# Patient Record
Sex: FEMALE | Race: WHITE | NOT HISPANIC OR LATINO | Employment: FULL TIME | URBAN - METROPOLITAN AREA
[De-identification: names, ages, dates, MRNs, and addresses within clinical notes are randomized per-mention and may not be internally consistent; named-entity substitution may affect disease eponyms.]

---

## 2019-07-22 ENCOUNTER — HOSPITAL ENCOUNTER (OUTPATIENT)
Dept: ULTRASOUND IMAGING | Facility: MEDICAL CENTER | Age: 49
Discharge: HOME | End: 2019-07-22
Payer: COMMERCIAL

## 2019-07-22 DIAGNOSIS — C73 PAPILLARY THYROID CARCINOMA (HCC): ICD-10-CM

## 2019-07-22 PROCEDURE — 76536 US EXAM OF HEAD AND NECK: CPT

## 2021-03-31 DIAGNOSIS — Z23 ENCOUNTER FOR IMMUNIZATION: ICD-10-CM

## 2022-08-02 ENCOUNTER — HOSPITAL ENCOUNTER (OUTPATIENT)
Dept: ULTRASOUND IMAGING | Facility: MEDICAL CENTER | Age: 52
Discharge: HOME | End: 2022-08-02
Payer: COMMERCIAL

## 2022-08-02 DIAGNOSIS — C73 PAPILLARY THYROID CARCINOMA (HCC): ICD-10-CM

## 2022-08-02 PROCEDURE — 76536 US EXAM OF HEAD AND NECK: CPT

## 2022-12-27 ENCOUNTER — OFFICE VISIT (OUTPATIENT)
Dept: FAMILY MEDICINE CLINIC | Facility: CLINIC | Age: 52
End: 2022-12-27

## 2022-12-27 VITALS
HEIGHT: 67 IN | TEMPERATURE: 98.1 F | SYSTOLIC BLOOD PRESSURE: 110 MMHG | OXYGEN SATURATION: 98 % | WEIGHT: 185 LBS | DIASTOLIC BLOOD PRESSURE: 80 MMHG | HEART RATE: 70 BPM | BODY MASS INDEX: 29.03 KG/M2

## 2022-12-27 DIAGNOSIS — J02.9 SORE THROAT: ICD-10-CM

## 2022-12-27 DIAGNOSIS — J01.00 ACUTE NON-RECURRENT MAXILLARY SINUSITIS: Primary | ICD-10-CM

## 2022-12-27 DIAGNOSIS — R05.1 ACUTE COUGH: ICD-10-CM

## 2022-12-27 DIAGNOSIS — R09.81 CONGESTION OF NASAL SINUS: ICD-10-CM

## 2022-12-27 RX ORDER — PSEUDOEPHEDRINE HCL 30 MG
100 TABLET ORAL
COMMUNITY
Start: 2022-12-19 | End: 2023-01-18

## 2022-12-27 RX ORDER — PHENAZOPYRIDINE HYDROCHLORIDE 100 MG/1
100 TABLET, FILM COATED ORAL
COMMUNITY
End: 2022-12-27 | Stop reason: ALTCHOICE

## 2022-12-27 RX ORDER — DOXYCYCLINE HYCLATE 50 MG/1
324 CAPSULE, GELATIN COATED ORAL
COMMUNITY
Start: 2022-12-19 | End: 2023-01-18

## 2022-12-27 RX ORDER — ALPRAZOLAM 0.25 MG/1
0.25 TABLET ORAL
COMMUNITY
Start: 2022-11-17

## 2022-12-27 RX ORDER — NITROFURANTOIN 25; 75 MG/1; MG/1
CAPSULE ORAL
COMMUNITY
Start: 2022-11-17 | End: 2022-12-27 | Stop reason: ALTCHOICE

## 2022-12-27 RX ORDER — LEVOTHYROXINE SODIUM 137 UG/1
137 TABLET ORAL DAILY
COMMUNITY
Start: 2014-03-05

## 2022-12-27 RX ORDER — AMOXICILLIN AND CLAVULANATE POTASSIUM 875; 125 MG/1; MG/1
1 TABLET, FILM COATED ORAL EVERY 12 HOURS SCHEDULED
Qty: 20 TABLET | Refills: 0
Start: 2022-12-27 | End: 2023-01-06

## 2022-12-27 NOTE — PROGRESS NOTES
Assessment/Plan:    No problem-specific Assessment & Plan notes found for this encounter  Diagnoses and all orders for this visit:    Acute non-recurrent maxillary sinusitis  -     amoxicillin-clavulanate (Augmentin) 875-125 mg per tablet; Take 1 tablet by mouth every 12 (twelve) hours for 10 days    Congestion of nasal sinus    Acute cough    Sore throat    Other orders  -     ALPRAZolam (XANAX) 0 25 mg tablet; Take 0 25 mg by mouth (Patient not taking: Reported on 12/27/2022)  -     Docusate Sodium (DSS) 100 MG CAPS; Take 100 mg by mouth  -     ferrous gluconate (FERGON) 324 mg tablet; Take 324 mg by mouth  -     levothyroxine 137 mcg tablet; Take 137 mcg by mouth daily  -     Discontinue: nitrofurantoin (MACROBID) 100 mg capsule; Take 1 capsule after intercourse (Patient not taking: Reported on 12/27/2022)  -     norethindrone (AYGESTIN) 5 mg tablet; Take 5 mg by mouth 2 (two) times a day  -     norethindrone-ethinyl estradiol 0 5/0 75/1-35 MG-MCG per tablet; Take 1 tablet by mouth every morning (Patient not taking: Reported on 12/27/2022)  -     Discontinue: phenazopyridine (PYRIDIUM) 100 mg tablet; Take 100 mg by mouth (Patient not taking: Reported on 12/27/2022)          Patient is a 58-year-old female presenting with symptoms for the past 10 days to 2 weeks  She has a history of sinus infections and her presentation today is most consistent with maxillary sinusitis  I will start patient on a course of Augmentin twice daily for 10 days  Medication was given from our office supply  I recommended supportive care at home with over-the-counter allergy medicine, Flonase, Mucinex as needed  She should follow-up with PCP or in our office if not improved after treatment course  Recommend to stay hydrated while feeling ill  20 minutes spent on examination and plan of care  This note was dictated using software  Subjective:      Patient ID: Vipul Poole is a 47 y o  female      HPI    The following portions of the patient's history were reviewed and updated as appropriate: allergies, current medications, past family history, past medical history, past social history, past surgical history and problem list      Patient states that she has congestion, cough, pressure in the face, sore throat  She states that this has been going on for the past two weeks  She states that last night her throat has felt on fire  She states that she has been having a lot of drainage in the throat  She also has pressure in the ears  Review of Systems   Constitutional: Negative for fever  HENT: Positive for congestion, ear pain, postnasal drip, sinus pressure, sinus pain and sore throat  Respiratory: Positive for cough  Negative for shortness of breath  Cardiovascular: Negative for chest pain  Gastrointestinal: Negative for abdominal pain, constipation, diarrhea, nausea and vomiting  Skin: Negative for rash  Neurological: Negative for headaches  Objective:      /80 (BP Location: Left arm, Patient Position: Sitting, Cuff Size: Standard)   Pulse 70   Temp 98 1 °F (36 7 °C)   Ht 5' 7" (1 702 m)   Wt 83 9 kg (185 lb)   SpO2 98%   BMI 28 98 kg/m²          Physical Exam  Vitals and nursing note reviewed  Constitutional:       General: She is not in acute distress  Appearance: Normal appearance  HENT:      Head: Normocephalic and atraumatic  Right Ear: Tympanic membrane, ear canal and external ear normal  There is no impacted cerumen  Left Ear: Tympanic membrane, ear canal and external ear normal  There is no impacted cerumen  Ears:      Comments: Fluid noted behind both tms     Nose: Congestion present  Mouth/Throat:      Mouth: Mucous membranes are moist       Pharynx: No oropharyngeal exudate or posterior oropharyngeal erythema  Comments: Postnasal drip  Eyes:      General:         Right eye: No discharge  Left eye: No discharge        Extraocular Movements: Extraocular movements intact  Conjunctiva/sclera: Conjunctivae normal       Pupils: Pupils are equal, round, and reactive to light  Cardiovascular:      Rate and Rhythm: Normal rate and regular rhythm  Heart sounds: Normal heart sounds  No murmur heard  No friction rub  No gallop  Pulmonary:      Effort: Pulmonary effort is normal  No respiratory distress  Breath sounds: Normal breath sounds  No wheezing  Musculoskeletal:         General: Normal range of motion  Cervical back: Normal range of motion  Skin:     General: Skin is warm and dry  Findings: No erythema or rash  Neurological:      General: No focal deficit present  Mental Status: She is alert and oriented to person, place, and time  Mental status is at baseline  Psychiatric:         Mood and Affect: Mood normal          Behavior: Behavior normal          Thought Content:  Thought content normal          Judgment: Judgment normal

## 2023-06-21 LAB
EXTERNAL HIV CONFIRMATION: NORMAL
EXTERNAL HIV SCREEN: NORMAL
EXTERNAL HIV SCREEN: NORMAL
HCV AB SER-ACNC: NEGATIVE

## 2023-09-18 ENCOUNTER — TELEPHONE (OUTPATIENT)
Dept: ADMINISTRATIVE | Facility: OTHER | Age: 53
End: 2023-09-18

## 2023-09-18 NOTE — TELEPHONE ENCOUNTER
----- Message from Vik Snow sent at 9/18/2023  7:54 AM EDT -----  Regarding: care gap request  01/04/23 1:48 PM    Hello, our patient attached above Pap Smear (HPV) aka Cervical Cancer Screening completed/performed. Please assist in updating the patient chart by pulling the Care Everywhere (CE) document. The date of service is 8/10/22.      Thank you,  Vik Snow  [address] [place] [address]

## 2023-09-18 NOTE — TELEPHONE ENCOUNTER
Upon review of the In Basket request we were able to locate, review, and update the patient chart as requested for Mammogram and Pap Smear (HPV) aka Cervical Cancer Screening. mammogram, HIV, HEP C    Any additional questions or concerns should be emailed to the Practice Liaisons via the appropriate education email address, please do not reply via In Basket.     Thank you  Sinan Robertson

## 2023-09-18 NOTE — TELEPHONE ENCOUNTER
----- Message from Mary Baker sent at 9/18/2023  7:55 AM EDT -----  Regarding: care gap request  01/04/23 1:48 PM    Hello, our patient attached above Hepatitis C and HIV completed/performed. Please assist in updating the patient chart by pulling the Care Everywhere (CE) document. The date of service is 6/21/23.      Thank you,  Mary Baker  [address] [place] [address]

## 2023-09-18 NOTE — TELEPHONE ENCOUNTER
----- Message from Candie Lee sent at 9/18/2023  7:54 AM EDT -----  Regarding: care gap request  01/04/23 1:48 PM    Hello, our patient attached above Mammogram completed/performed. Please assist in updating the patient chart by pulling the Care Everywhere (CE) document. The date of service is 6/28/23.      Thank you,  Candie Lee  [address] [facility]

## 2023-09-18 NOTE — TELEPHONE ENCOUNTER
----- Message from Mariela Armendariz sent at 9/18/2023  7:55 AM EDT -----  Regarding: care gap request  01/04/23 1:48 PM    Hello, our patient attached above Hepatitis C and HIV completed/performed. Please assist in updating the patient chart by pulling the Care Everywhere (CE) document. The date of service is 6/21/23.      Thank you,  Mariela Armendariz  [address] [facility]

## 2023-09-19 PROBLEM — F41.1 GENERALIZED ANXIETY DISORDER: Status: ACTIVE | Noted: 2018-08-30

## 2023-09-19 PROBLEM — E78.5 HYPERLIPIDEMIA: Status: ACTIVE | Noted: 2017-07-06

## 2023-09-19 PROBLEM — B00.9 HERPES: Status: ACTIVE | Noted: 2023-09-19

## 2023-09-19 PROBLEM — M54.2 NECK PAIN: Status: ACTIVE | Noted: 2023-09-19

## 2023-09-19 PROBLEM — M17.9 OSTEOARTHRITIS OF KNEE: Status: ACTIVE | Noted: 2023-09-19

## 2023-09-19 PROBLEM — R07.9 CHEST PAIN SYNDROME: Status: ACTIVE | Noted: 2017-07-06

## 2023-09-19 PROBLEM — I49.3 PVC'S (PREMATURE VENTRICULAR CONTRACTIONS): Status: ACTIVE | Noted: 2023-09-19

## 2023-09-19 RX ORDER — DOXYCYCLINE 100 MG/1
CAPSULE ORAL
COMMUNITY

## 2023-09-19 RX ORDER — METRONIDAZOLE 500 MG/1
TABLET ORAL
COMMUNITY
Start: 2023-09-10

## 2023-09-19 RX ORDER — PHENAZOPYRIDINE HYDROCHLORIDE 200 MG/1
TABLET, FILM COATED ORAL
COMMUNITY
Start: 2023-09-08

## 2023-09-19 RX ORDER — PREDNISONE 20 MG/1
TABLET ORAL
COMMUNITY

## 2023-09-19 RX ORDER — CLOTRIMAZOLE AND BETAMETHASONE DIPROPIONATE 10; .64 MG/G; MG/G
CREAM TOPICAL
COMMUNITY

## 2023-09-19 RX ORDER — MELOXICAM 15 MG/1
TABLET ORAL
COMMUNITY

## 2023-09-19 RX ORDER — PANTOPRAZOLE SODIUM 40 MG/1
TABLET, DELAYED RELEASE ORAL
COMMUNITY

## 2023-09-19 RX ORDER — VALACYCLOVIR HYDROCHLORIDE 500 MG/1
TABLET, FILM COATED ORAL
COMMUNITY

## 2023-09-19 RX ORDER — NITROFURANTOIN 25; 75 MG/1; MG/1
CAPSULE ORAL
COMMUNITY

## 2023-09-19 RX ORDER — DICLOFENAC SODIUM 30 MG/G
GEL TOPICAL
COMMUNITY

## 2023-09-19 RX ORDER — FLUCONAZOLE 150 MG/1
1 TABLET ORAL ONCE
COMMUNITY

## 2023-09-19 RX ORDER — NITROFURANTOIN 25; 75 MG/1; MG/1
CAPSULE ORAL
COMMUNITY
Start: 2023-08-31

## 2023-09-19 RX ORDER — DICYCLOMINE HCL 20 MG
TABLET ORAL
COMMUNITY

## 2023-09-19 RX ORDER — SUCRALFATE ORAL 1 G/10ML
SUSPENSION ORAL
COMMUNITY

## 2023-09-19 RX ORDER — PHENAZOPYRIDINE HYDROCHLORIDE 200 MG/1
TABLET, FILM COATED ORAL
COMMUNITY

## 2023-11-27 ENCOUNTER — HOSPITAL ENCOUNTER (OUTPATIENT)
Dept: ULTRASOUND IMAGING | Facility: MEDICAL CENTER | Age: 53
Discharge: HOME | End: 2023-11-27
Payer: COMMERCIAL

## 2023-11-27 DIAGNOSIS — Z08 ENCOUNTER FOR FOLLOW-UP SURVEILLANCE OF THYROID CANCER: ICD-10-CM

## 2023-11-27 DIAGNOSIS — Z85.850 ENCOUNTER FOR FOLLOW-UP SURVEILLANCE OF THYROID CANCER: ICD-10-CM

## 2023-11-27 PROCEDURE — 76536 US EXAM OF HEAD AND NECK: CPT

## 2024-11-17 ENCOUNTER — OFFICE VISIT (OUTPATIENT)
Age: 54
End: 2024-11-17
Payer: COMMERCIAL

## 2024-11-17 VITALS
HEIGHT: 66 IN | SYSTOLIC BLOOD PRESSURE: 110 MMHG | TEMPERATURE: 96.7 F | WEIGHT: 150 LBS | DIASTOLIC BLOOD PRESSURE: 72 MMHG | BODY MASS INDEX: 24.11 KG/M2 | HEART RATE: 71 BPM | OXYGEN SATURATION: 97 % | RESPIRATION RATE: 18 BRPM
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
## 2024-11-17 DIAGNOSIS — S10.96XA TICK BITE OF NECK, INITIAL ENCOUNTER: Primary | ICD-10-CM

## 2024-11-17 PROCEDURE — G0382 LEV 3 HOSP TYPE B ED VISIT: HCPCS | Performed by: PHYSICIAN ASSISTANT

## 2024-11-17 PROCEDURE — S9083 URGENT CARE CENTER GLOBAL: HCPCS | Performed by: PHYSICIAN ASSISTANT

## 2024-11-17 RX ORDER — DOXYCYCLINE 100 MG/1
200 CAPSULE ORAL ONCE
Qty: 2 CAPSULE | Refills: 0 | Status: SHIPPED | OUTPATIENT
Start: 2024-11-17 | End: 2024-11-17

## 2024-11-21 NOTE — PROGRESS NOTES
[facility]        NAME: She Starks is a 54 y.o. female  : 1970    MRN: [id number]  DATE: 2024  TIME: 2:22 PM    Assessment and Plan   Tick bite of neck, initial encounter [S10.96XA, W57.XXXA]  1. Tick bite of neck, initial encounter  doxycycline monohydrate (MONODOX) 100 mg capsule            Patient Instructions     Patient has a tick bite of the left posterior neck that she partially removed but the head is still embedded.  Was unable to remove despite attempt to do so with splinter forceps and and end of an 11 blade scalpel.  I did provide patient reassurance that it should grow out with the skin.  I prophylax her with a 200 mg one-time dose of doxycycline and discussed local wound care.  Area was dressed with topical antibiotic ointment and a Band-Aid.  Should be reevaluated if any complications arise.  She should also monitor for any onset of acute Lyme symptoms and follow-up with her PCP for Lyme testing if this occurs.  She did note at the end of the visit that she definitively wanted the head removed and that she may be going somewhere else to have this done.  Follow up with PCP in 3-5 days.  Proceed to  ER if symptoms worsen.    If tests have been performed at [facility], our office will contact you with results if changes need to be made to the care plan discussed with you at the visit.  [facility]    Chief Complaint     Chief Complaint   Patient presents with    Tick Removal     Tick back of neck from today.          History of Present Illness       Patient presents with what she reports is a tick bite of her left posterior neck.  She noticed it today and she tried to remove the tick but believes there is still a piece embedded in her skin.  She is requesting the remainder of the tick be removed.  She denies any other symptoms or complaints at this time.        Review of Systems   Review of Systems   Constitutional: Negative.     Eyes: Negative.    Respiratory: Negative.     Cardiovascular: Negative.    Gastrointestinal: Negative.    Genitourinary: Negative.    Musculoskeletal: Negative.    Skin:  Positive for wound (Tick bite left posterior neck). Negative for rash.   Neurological: Negative.          Current Medications       Current Outpatient Medications:     ALPRAZolam (XANAX) 0.25 mg tablet, Take 0.25 mg by mouth, Disp: , Rfl:     B Complex Vitamins (B COMPLEX 1 PO), Take 1 capsule by mouth every morning, Disp: , Rfl:     levothyroxine 137 mcg tablet, Take 137 mcg by mouth daily, Disp: , Rfl:     ascorbic acid (VITAMIN C) 500 MG tablet, Take 1 tablet by mouth every morning (Patient not taking: Reported on 11/17/2024), Disp: , Rfl:     Chelated Zinc 50 MG TABS, Take 50 mg by mouth (Patient not taking: Reported on 11/17/2024), Disp: , Rfl:     clotrimazole-betamethasone (LOTRISONE) 1-0.05 % cream, MASSAGE TWICE DAILY INTO THE AREA AROUND THE VAGINA UNTIL SYMPTOMS RESOLVE. (Patient not taking: Reported on 11/17/2024), Disp: , Rfl:     Diclofenac Sodium (SOLARAZE) 3 % GEL, , Disp: , Rfl:     dicyclomine (BENTYL) 20 mg tablet, , Disp: , Rfl:     Docusate Sodium (DSS) 100 MG CAPS, Take 100 mg by mouth, Disp: , Rfl:     doxycycline monohydrate (MONODOX) 100 mg capsule, TAKE 1 CAPSULE (100 MG TOTAL) BY MOUTH IN THE MORNING AND IN THE EVENING FOR 10 DAYS (Patient not taking: Reported on 11/17/2024), Disp: , Rfl:     ferrous gluconate (FERGON) 324 mg tablet, Take 324 mg by mouth, Disp: , Rfl:     fluconazole (DIFLUCAN) 150 mg tablet, Take 1 tablet by mouth once (Patient not taking: Reported on 11/17/2024), Disp: , Rfl:     meloxicam (MOBIC) 15 mg tablet, TAKE 1 TABLET BY MOUTH DAILY AFTER A MEAL (Patient not taking: Reported on 11/17/2024), Disp: , Rfl:     metroNIDAZOLE (FLAGYL) 500 mg tablet, TAKE 1 TABLET BY MOUTH TWICE A DAY FOR 7 DAYS (Patient not taking: Reported on 11/17/2024), Disp: , Rfl:     Multiple Vitamins-Minerals  (HAIR/SKIN/NAILS/BIOTIN PO), Take 1 tablet by mouth every morning (Patient not taking: Reported on 11/17/2024), Disp: , Rfl:     nitrofurantoin (MACROBID) 100 mg capsule, TAKE 1 CAPSULE AFTER INTERCOURSE (Patient not taking: Reported on 11/17/2024), Disp: , Rfl:     nitrofurantoin (MACROBID) 100 mg capsule, TAKE 1 CAPSULE AFTER INTERCOURSE (Patient not taking: Reported on 11/17/2024), Disp: , Rfl:     norethindrone (AYGESTIN) 5 mg tablet, Take 5 mg by mouth 2 (two) times a day, Disp: , Rfl:     norethindrone-ethinyl estradiol 0.5/0.75/1-35 MG-MCG per tablet, Take 1 tablet by mouth every morning (Patient not taking: Reported on 12/27/2022), Disp: , Rfl:     pantoprazole (PROTONIX) 40 mg tablet, , Disp: , Rfl:     phenazopyridine (PYRIDIUM) 200 mg tablet, TAKE 1 TABLET (200 MG TOTAL) BY MOUTH 3 (THREE) TIMES DAILY FOR 2 DAYS. AFTER MEALS (Patient not taking: Reported on 11/17/2024), Disp: , Rfl:     phenazopyridine (PYRIDIUM) 200 mg tablet, TAKE 1 TABLET BY MOUTH THREE TIMES A DAY AS NEEDED FOR PAIN (Patient not taking: Reported on 11/17/2024), Disp: , Rfl:     predniSONE 20 mg tablet, PLEASE SEE ATTACHED FOR DETAILED DIRECTIONS (Patient not taking: Reported on 11/17/2024), Disp: , Rfl:     sucralfate (Carafate) 1 g/10 mL suspension, , Disp: , Rfl:     valACYclovir (VALTREX) 500 mg tablet, TAKE 1 TABLET BY MOUTH TWICE A DAY FOR 5 DAYS (Patient not taking: Reported on 11/17/2024), Disp: , Rfl:     Current Allergies     Allergies as of 11/17/2024 - Reviewed 11/17/2024   Allergen Reaction Noted    Sulfa antibiotics Other (See Comments) 10/03/2013            The following portions of the patient's history were reviewed and updated as appropriate: allergies, current medications, past family history, past medical history, past social history, past surgical history and problem list.     Past Medical History:   Diagnosis Date    Anxiety     Chronic back pain     Fibroids        Past Surgical History:   Procedure Laterality  "Date     SECTION       2009    THYROIDECTOMY         Family History   Problem Relation Age of Onset    Breast cancer Mother     Lung cancer Father     Colon cancer Maternal Grandmother          Medications have been verified.        Objective   /72   Pulse 71   Temp (!) 96.7 °F (35.9 °C)   Resp 18   Ht 5' 6\" (1.676 m)   Wt 68 kg (150 lb)   SpO2 97%   BMI 24.21 kg/m²   No LMP recorded.       Physical Exam     Physical Exam  Vitals reviewed.   Constitutional:       General: She is not in acute distress.     Appearance: She is well-developed.   Skin:     Comments: Left posterior neck with evidence of tick bite wound.  Head of the tick is embedded.  There is mild inflammatory reaction surrounding the tick bite wound but no evidence of local infection.  No sign of ECM.  Attempted to remove head of the tick using splinter forceps as well as the end of an 11 blade scalpel but was unsuccessful.   Neurological:      Mental Status: She is alert and oriented to person, place, and time.                   "